# Patient Record
Sex: FEMALE | Race: WHITE | NOT HISPANIC OR LATINO | Employment: OTHER | URBAN - METROPOLITAN AREA
[De-identification: names, ages, dates, MRNs, and addresses within clinical notes are randomized per-mention and may not be internally consistent; named-entity substitution may affect disease eponyms.]

---

## 2017-06-25 ENCOUNTER — HOSPITAL ENCOUNTER (INPATIENT)
Facility: MEDICAL CENTER | Age: 22
LOS: 1 days | End: 2017-06-26
Attending: OBSTETRICS & GYNECOLOGY | Admitting: OBSTETRICS & GYNECOLOGY
Payer: MEDICAID

## 2017-06-25 LAB
BASOPHILS # BLD AUTO: 0.4 % (ref 0–1.8)
BASOPHILS # BLD: 0.04 K/UL (ref 0–0.12)
EOSINOPHIL # BLD AUTO: 0.16 K/UL (ref 0–0.51)
EOSINOPHIL NFR BLD: 1.8 % (ref 0–6.9)
ERYTHROCYTE [DISTWIDTH] IN BLOOD BY AUTOMATED COUNT: 39.8 FL (ref 35.9–50)
ERYTHROCYTE [DISTWIDTH] IN BLOOD BY AUTOMATED COUNT: 40.5 FL (ref 35.9–50)
HCT VFR BLD AUTO: 37.5 % (ref 37–47)
HCT VFR BLD AUTO: 38.3 % (ref 37–47)
HGB BLD-MCNC: 12.4 G/DL (ref 12–16)
HGB BLD-MCNC: 12.8 G/DL (ref 12–16)
HOLDING TUBE BB 8507: NORMAL
IMM GRANULOCYTES # BLD AUTO: 0.05 K/UL (ref 0–0.11)
IMM GRANULOCYTES NFR BLD AUTO: 0.6 % (ref 0–0.9)
LYMPHOCYTES # BLD AUTO: 2.7 K/UL (ref 1–4.8)
LYMPHOCYTES NFR BLD: 30.3 % (ref 22–41)
MCH RBC QN AUTO: 26.8 PG (ref 27–33)
MCH RBC QN AUTO: 27 PG (ref 27–33)
MCHC RBC AUTO-ENTMCNC: 33.1 G/DL (ref 33.6–35)
MCHC RBC AUTO-ENTMCNC: 33.4 G/DL (ref 33.6–35)
MCV RBC AUTO: 80.8 FL (ref 81.4–97.8)
MCV RBC AUTO: 81 FL (ref 81.4–97.8)
MONOCYTES # BLD AUTO: 0.73 K/UL (ref 0–0.85)
MONOCYTES NFR BLD AUTO: 8.2 % (ref 0–13.4)
NEUTROPHILS # BLD AUTO: 5.24 K/UL (ref 2–7.15)
NEUTROPHILS NFR BLD: 58.7 % (ref 44–72)
NRBC # BLD AUTO: 0 K/UL
NRBC BLD AUTO-RTO: 0 /100 WBC
PLATELET # BLD AUTO: 169 K/UL (ref 164–446)
PLATELET # BLD AUTO: 206 K/UL (ref 164–446)
PMV BLD AUTO: 10 FL (ref 9–12.9)
PMV BLD AUTO: 10.1 FL (ref 9–12.9)
RBC # BLD AUTO: 4.63 M/UL (ref 4.2–5.4)
RBC # BLD AUTO: 4.74 M/UL (ref 4.2–5.4)
WBC # BLD AUTO: 8.9 K/UL (ref 4.8–10.8)
WBC # BLD AUTO: 9.5 K/UL (ref 4.8–10.8)

## 2017-06-25 PROCEDURE — 85025 COMPLETE CBC W/AUTO DIFF WBC: CPT

## 2017-06-25 PROCEDURE — 700105 HCHG RX REV CODE 258: Performed by: PHYSICIAN ASSISTANT

## 2017-06-25 PROCEDURE — 3E033VJ INTRODUCTION OF OTHER HORMONE INTO PERIPHERAL VEIN, PERCUTANEOUS APPROACH: ICD-10-PCS | Performed by: OBSTETRICS & GYNECOLOGY

## 2017-06-25 PROCEDURE — 700102 HCHG RX REV CODE 250 W/ 637 OVERRIDE(OP): Performed by: PHYSICIAN ASSISTANT

## 2017-06-25 PROCEDURE — 700101 HCHG RX REV CODE 250

## 2017-06-25 PROCEDURE — 770002 HCHG ROOM/CARE - OB PRIVATE (112)

## 2017-06-25 PROCEDURE — 10907ZC DRAINAGE OF AMNIOTIC FLUID, THERAPEUTIC FROM PRODUCTS OF CONCEPTION, VIA NATURAL OR ARTIFICIAL OPENING: ICD-10-PCS | Performed by: OBSTETRICS & GYNECOLOGY

## 2017-06-25 PROCEDURE — 700111 HCHG RX REV CODE 636 W/ 250 OVERRIDE (IP): Performed by: PHYSICIAN ASSISTANT

## 2017-06-25 PROCEDURE — 36415 COLL VENOUS BLD VENIPUNCTURE: CPT

## 2017-06-25 PROCEDURE — 700111 HCHG RX REV CODE 636 W/ 250 OVERRIDE (IP)

## 2017-06-25 PROCEDURE — A9270 NON-COVERED ITEM OR SERVICE: HCPCS | Performed by: PHYSICIAN ASSISTANT

## 2017-06-25 PROCEDURE — 85027 COMPLETE CBC AUTOMATED: CPT

## 2017-06-25 RX ORDER — IBUPROFEN 800 MG/1
800 TABLET ORAL EVERY 8 HOURS PRN
Status: DISCONTINUED | OUTPATIENT
Start: 2017-06-25 | End: 2017-06-26 | Stop reason: HOSPADM

## 2017-06-25 RX ORDER — METHYLERGONOVINE MALEATE 0.2 MG/ML
0.2 INJECTION INTRAVENOUS
Status: DISCONTINUED | OUTPATIENT
Start: 2017-06-25 | End: 2017-06-26 | Stop reason: HOSPADM

## 2017-06-25 RX ORDER — ONDANSETRON 4 MG/1
4 TABLET, ORALLY DISINTEGRATING ORAL EVERY 6 HOURS PRN
Status: DISCONTINUED | OUTPATIENT
Start: 2017-06-25 | End: 2017-06-26 | Stop reason: HOSPADM

## 2017-06-25 RX ORDER — OXYCODONE AND ACETAMINOPHEN 10; 325 MG/1; MG/1
1 TABLET ORAL EVERY 4 HOURS PRN
Status: DISCONTINUED | OUTPATIENT
Start: 2017-06-25 | End: 2017-06-26 | Stop reason: HOSPADM

## 2017-06-25 RX ORDER — BUPIVACAINE HYDROCHLORIDE 2.5 MG/ML
INJECTION, SOLUTION EPIDURAL; INFILTRATION; INTRACAUDAL
Status: ACTIVE
Start: 2017-06-25 | End: 2017-06-25

## 2017-06-25 RX ORDER — MISOPROSTOL 200 UG/1
600 TABLET ORAL
Status: DISCONTINUED | OUTPATIENT
Start: 2017-06-25 | End: 2017-06-26 | Stop reason: HOSPADM

## 2017-06-25 RX ORDER — MISOPROSTOL 200 UG/1
800 TABLET ORAL
Status: DISCONTINUED | OUTPATIENT
Start: 2017-06-25 | End: 2017-06-25 | Stop reason: HOSPADM

## 2017-06-25 RX ORDER — OXYCODONE HYDROCHLORIDE AND ACETAMINOPHEN 5; 325 MG/1; MG/1
1 TABLET ORAL EVERY 4 HOURS PRN
Status: DISCONTINUED | OUTPATIENT
Start: 2017-06-25 | End: 2017-06-26 | Stop reason: HOSPADM

## 2017-06-25 RX ORDER — SODIUM CHLORIDE, SODIUM LACTATE, POTASSIUM CHLORIDE, CALCIUM CHLORIDE 600; 310; 30; 20 MG/100ML; MG/100ML; MG/100ML; MG/100ML
INJECTION, SOLUTION INTRAVENOUS CONTINUOUS
Status: DISPENSED | OUTPATIENT
Start: 2017-06-25 | End: 2017-06-25

## 2017-06-25 RX ORDER — METHYLERGONOVINE MALEATE 0.2 MG/ML
0.2 INJECTION INTRAVENOUS
Status: DISCONTINUED | OUTPATIENT
Start: 2017-06-25 | End: 2017-06-25 | Stop reason: HOSPADM

## 2017-06-25 RX ORDER — SODIUM CHLORIDE, SODIUM LACTATE, POTASSIUM CHLORIDE, CALCIUM CHLORIDE 600; 310; 30; 20 MG/100ML; MG/100ML; MG/100ML; MG/100ML
INJECTION, SOLUTION INTRAVENOUS PRN
Status: DISCONTINUED | OUTPATIENT
Start: 2017-06-25 | End: 2017-06-26 | Stop reason: HOSPADM

## 2017-06-25 RX ORDER — ROPIVACAINE HYDROCHLORIDE 2 MG/ML
INJECTION, SOLUTION EPIDURAL; INFILTRATION; PERINEURAL
Status: COMPLETED
Start: 2017-06-25 | End: 2017-06-25

## 2017-06-25 RX ORDER — ONDANSETRON 2 MG/ML
4 INJECTION INTRAMUSCULAR; INTRAVENOUS EVERY 6 HOURS PRN
Status: DISCONTINUED | OUTPATIENT
Start: 2017-06-25 | End: 2017-06-26 | Stop reason: HOSPADM

## 2017-06-25 RX ORDER — ACETAMINOPHEN 325 MG/1
325 TABLET ORAL EVERY 4 HOURS PRN
Status: DISCONTINUED | OUTPATIENT
Start: 2017-06-25 | End: 2017-06-26 | Stop reason: HOSPADM

## 2017-06-25 RX ADMIN — ROPIVACAINE HYDROCHLORIDE 200 MG: 2 INJECTION, SOLUTION EPIDURAL; INFILTRATION at 07:15

## 2017-06-25 RX ADMIN — Medication 41.67 MILLI-UNITS/MIN: at 12:01

## 2017-06-25 RX ADMIN — IBUPROFEN 800 MG: 800 TABLET, FILM COATED ORAL at 19:30

## 2017-06-25 RX ADMIN — SODIUM CHLORIDE, POTASSIUM CHLORIDE, SODIUM LACTATE AND CALCIUM CHLORIDE 1000 ML: 600; 310; 30; 20 INJECTION, SOLUTION INTRAVENOUS at 07:08

## 2017-06-25 RX ADMIN — Medication 332.67 MILLI-UNITS/MIN: at 10:45

## 2017-06-25 ASSESSMENT — PAIN SCALES - GENERAL
PAINLEVEL_OUTOF10: 0
PAINLEVEL_OUTOF10: 2
PAINLEVEL_OUTOF10: 3
PAINLEVEL_OUTOF10: 0

## 2017-06-25 ASSESSMENT — COPD QUESTIONNAIRES
HAVE YOU SMOKED AT LEAST 100 CIGARETTES IN YOUR ENTIRE LIFE: NO/DON'T KNOW
DURING THE PAST 4 WEEKS HOW MUCH DID YOU FEEL SHORT OF BREATH: NONE/LITTLE OF THE TIME
DO YOU EVER COUGH UP ANY MUCUS OR PHLEGM?: NO/ONLY WITH OCCASIONAL COLDS OR INFECTIONS

## 2017-06-25 ASSESSMENT — LIFESTYLE VARIABLES
DO YOU DRINK ALCOHOL: NO
EVER_SMOKED: NEVER
DO YOU DRINK ALCOHOL: NO
ALCOHOL_USE: NO

## 2017-06-25 ASSESSMENT — PATIENT HEALTH QUESTIONNAIRE - PHQ9
SUM OF ALL RESPONSES TO PHQ QUESTIONS 1-9: 0
SUM OF ALL RESPONSES TO PHQ9 QUESTIONS 1 AND 2: 0
1. LITTLE INTEREST OR PLEASURE IN DOING THINGS: NOT AT ALL
2. FEELING DOWN, DEPRESSED, IRRITABLE, OR HOPELESS: NOT AT ALL

## 2017-06-25 NOTE — PROGRESS NOTES
, SNEHA , SNEHA 39.6    +FM, denies LOF, VB.    Presenting with CO LINA's. Wes she drove from [facility] today.  She has PNC in [place] and has PNR with her/    A+/I/-/-  SVE 3/60/-2    0300 Spoke to Kendra, will recheck cervix in 1 hour.    0350 /-2. Kendra aware continue to monitor.

## 2017-06-25 NOTE — PROGRESS NOTES
Received patient to room S-342 via w/c.  Report received from MAVIS Hoffman and assumed care of patient.  Nursing assessment done.  She denies pain.  She was oriented to room, call light, infant security, emergency light, visiting hours, and unit routine.  Plan of care discussed.  Encouraged to call with any needs.

## 2017-06-25 NOTE — DELIVERY NOTE
VAGINAL DELIVERY PRODEDURE NOTE    PATIENT ID:  NAME:  Isela Mcnamara  MRN:               [id number]  YOB: 1995    On 2017, this 22 y.o., now 39w6d , GBS negative female delivered precipitously by RN via  under epidural anesthesia a viable male infant whose weights and APGAR scores are currently unknown. There was no nuchal cord and infant was bulb suctioned at delivery. Cord was doubly clamped, cut and infant placed on mothers abdomen. An intact placenta was delivered spontaneously with 3 vessel cord. Upon vaginal exam, there were no lacerations. Estimated blood loss was 300cc. Patient will be transferred to postpartum in stable condition and infant to  nursery.    Anahi Lei M.D.    Delivery attended by Dr. Nieves.

## 2017-06-25 NOTE — CARE PLAN
Problem: Potential for postpartum infection related to presence of episiotomy/vaginal tear and/or uterine contamination  Goal: Patient will be absent from signs and symptoms of infection  Outcome: PROGRESSING AS EXPECTED  Patient has no signs/symptoms of infection.  Vital signs stable. Ambulating without difficulty    Problem: Alteration in comfort related to episiotomy, vaginal repair and/or after birth pains  Goal: Patient verbalizes acceptable pain level  Outcome: PROGRESSING AS EXPECTED  Patient states pain control is adequate

## 2017-06-25 NOTE — H&P
History and Physical    Isela Mcnamara is a 22 y.o. female  -Para:     Gestational Age:  39w6d  Admitted for:   Active Labor  Admitted to  [facility].  Patient received prenatal care: In [place] - records with pt    HPI: Patient is admitted with the above mentioned Chief Complaint and States   Loss of fluid:   negative  Abdominal Pain:  negative  Uterine Contractions:  positive  Vaginal Bleeding:  negative  Fetal Movement:  normal  Patient denies fever, chills, nausea, vomiting , headache, visual disturbance, or dysuria  LMP 16.   Estimated Date of Delivery: 17  Final SNEHA: 17, by Last Menstrual Period    Patient Active Problem List    Diagnosis Date Noted   • Short interval between pregnancies complicating pregnancy, antepartum- pt delivered in March of 2013 10/18/2013   • Late prenatal care @ 24 wks  10/18/2013   • Supervision of other high-risk pregnancy 10/18/2013   • BMI 35.0-35.9,adult 10/18/2013   • Previous  delivery, antepartum 2013       Admitting DX: Pregnancy  Pregnancy Complications:  Hx PTD x 2 - 34, 36 wk  OB Risk Factors:   none  Labor State:    Active phase labor.    History:   has no past medical history of Addisons disease, Adrenal disorder, Allergy, Anemia, Anxiety, Arrhythmia, Arthritis, ASTHMA, Blood transfusion, Cancer, CATARACT, CHF (congestive heart failure), Clotting disorder, COPD, Cushings syndrome, Depression, Diabetes, Diabetic neuropathy, EMPHYSEMA, GERD (gastroesophageal reflux disease), Glaucoma, Goiter, Headache(784.0), Heart attack, Heart murmur, HIV (human immunodeficiency virus infection), Hyperlipidemia, Parathyroid disorder, Hypertension, IBD (inflammatory bowel disease), Kidney disease, Meningitis, Migraine, Muscle disorder, OSTEOPOROSIS, Pituitary disease, Seizure, Sickle cell disease, Stroke, Substance abuse, Thyroid disease, Tuberculosis, Ulcer, or Urinary tract infection, site not specified.     has no past  "surgical history on file.    OB History    Para Term  AB SAB TAB Ectopic Multiple Living   3 2 1 1      2      # Outcome Date GA Lbr Maycol/2nd Weight Sex Delivery Anes PTL Lv   3 Current            2 Term 13 40w0d  3.062 kg (6 lb 12 oz) F Vag-Spont EPI N Y      Comments: No complications    1  11 35w0d  2.211 kg (4 lb 14 oz) F Vag-Spont EPI  Y          Medications:  No current facility-administered medications on file prior to encounter.     Current Outpatient Prescriptions on File Prior to Encounter   Medication Sig Dispense Refill   • ibuprofen (MOTRIN) 800 MG TABS Take 1 Tab by mouth every 8 hours as needed (Cramping). 30 Tab 1   • docusate sodium (COLACE) 100 MG CAPS Take 1 Cap by mouth 2 times a day as needed for Constipation. 30 Cap 0   • oxycodone-acetaminophen (PERCOCET) 5-325 MG TABS Take 1 Tab by mouth every four hours as needed ((Pain Scale 4-6); If acetaminophen ineffective). 15 Each 0   • Prenatal MV-Min-Fe Fum-FA-DHA (PRENATAL 1 PO) Take  by mouth.           Allergies:  Nkda    ROS:   Neuro: negative    Cardiovascular: negative  Gastro intestinal: negative  Genitourinary: negative            Physical Exam:  /75 mmHg  Pulse 68  Ht 1.651 m (5' 5\")  Wt 99.338 kg (219 lb)  BMI 36.44 kg/m2  LMP 2013  Constitutional: healthy-appearing, Well-developed, well-nourished  No JVD: while supine  HEENT: PERRLA  Breast Exam: negative  Cardio: regular rate and rhythm  Lung: unlabored respirations, no intercostal retractions or accessory muscle use  Abdomen: abdomen is soft without significant tenderness, masses, organomegaly or guarding  Extremity: extremities, peripheral pulses and reflexes normal    Cervical Exam: 70%  Cervix Dilatation: 5  Station: negative 2  Pelvis: Normal  Fetal Assessment: Fetal heart variability: moderate  Fetal Heart Rate decelerations: none  Fetal Heart Rate accelerations: yes  Baseline FHR: 140 per minute  Uterine contractions: regular, every " 5-6 minutes  Estimated Fetal Weight: 3000 - 3500g      Labs:      Prenatal Results         1st Trimester Date Time   ABO  A  13 1401   RH  POS  13 1401   Antibody  NEG  13 1401   CBC/PLT/DIFF      HGB      Platelets      HGB A1C       1 Hr GCT      3 Hr GTT      Rubella  83.40 IU/mL 13 1401   RPR  Non Reactive  13 1401   Urine Culture      24 Urine Protein       24 Urine Creatinine       HBsAg  Negative  13 1401   Hep CAB       HIV      Gonorrhea  negative  13    Chlamydia  negative  13    TSH       Free T4        TB      Pap      SYPHILUS TREP QUAL [id number] [[id number]][      2nd Trimester Date Time   HCT      HGB      1 Hr GCT      3 Hr GTT      24-28 Weeks Date Time   1 Hr GCT       TSH       Free T4       24 Urine Protein      24 Urine Creatinine      BUN      Creatinine      GFR      AST      ALT      Uric Acid      LDH      3rd Trimester Date Time   HCT      HGB      TSH      Free T4      24 Hr Urine Protein      24 Hr Urine Creatinine      SYPHILUS TREP QUAL      35-37 Weeks Date Time   GBS PCR LB      GBS PCR      Genetic Screening Date Time   Cystic Fibrosis      AFP Quad      Sickle Cell                  View all results for this pregnancy            Assessment:  Gestational Age:  39w6d  Labor State:   Labor, Active  Risk Factors:   Hx PTD x 2  Pregnancy Complications: none    Patient Active Problem List    Diagnosis Date Noted   • Short interval between pregnancies complicating pregnancy, antepartum- pt delivered in March of 2013 10/18/2013   • Late prenatal care @ 24 wks  10/18/2013   • Supervision of other high-risk pregnancy 10/18/2013   • BMI 35.0-35.9,adult 10/18/2013   • Previous  delivery, antepartum 2013       Plan:   Admitted for: Active Labor, per [facility] records GBS neg, pain control, augment labor with pit, arom prn, anticipate .     Kendra Ya, PGiorgioA.

## 2017-06-25 NOTE — PROGRESS NOTES
"0700 - Report received from Chrystal JASSO RN at , care assumed. Hernandez Gestations today at 39.6 Weeks    EFW - [phone]    Patient is in bed awake - working through contractions with good control. IV is bolusing with LR, patient would like to have an epidural before contractions become unbearable. FOB \"Christopher\" at  sleeping on the couch - patient is not expecting more visitors today. Patient would like the FOB at  during the pushing and delivery phase.    Reports no sleep last night - Patient was traveling from [place] yesterday when she started labor. PNC obtained in [place]; records available in the chart. Denies ill feeling, reports FM, denies ROM or Bleeding - reports a scant amount of brown blood yesterday. No change to vision/edema/HA; states she has been getting up to the BR herself without assist, denies dizziness or weakness.     Use of FM/[place] discussed and in place, discussed POC, sleepy, pleasant affect/mood. Review of labor process/expectations, breathing/relaxation techniques. Discussed pain management options - patient in the the process of prep for an epidural placement. Patient given an Epidural consent to review prior to hard labor.     Patient/FOB deny questions/concerns regarding care since arrival to [facility]. State understanding of POC/expectations.  RN contact information updated on the white board with discussion regarding how to request assistance.  Patient encouraged to call RN with all questions/concerns/needs.    0720 - Epidural placed without event by Dr. Hu. Patient reports relief within 20 minutes. Plans to sleep.     1026 - Rapid dilation from 6 to delivery. Patient put in stirups and howell removed- patient states at this time the baby is coming out. RN notes the baby is not  however movement is noted. Patient is comfortable and not pushing, Dr. Lei is in the room as the baby delivers.  of male, Dr. NEDRA Lei and Dr. Nieves at . Lynn GONZALEZ RN at  to care for NB. " "No repair needed. Patient denies pain or discomfort. Epidural turned off. Food tray ordered from [facility] kitchen. Water and Apple juice at BS. FOB and patients two sisters at BS during delivery per patient request.     1200 - Sitting up eating food. States she can only move her right leg, toes/ankle on left. Denies needs. Denies pain/discomfort.     1400 - Discussed expectations of transfer to PPU. Patient/FOB deny questions/concerns/needs regarding care since arrival to [facility]. Up to the BR with assist. Positive void and use of jorge bottle. To PPU room 342 by W/C, patient holding the NB. FOB \"Christopher\" at side. Report to Nohemy LARA RN  "

## 2017-06-25 NOTE — CARE PLAN
Problem: Safety  Goal: Free from accidental injury  Outcome: PROGRESSING AS EXPECTED  Patient/Family instructed to call RN with any spills, cords in the way on the floor or any other safety hazards. Patient/Family also instructed to call RN with change to LOC, feelings of dizziness, blurry vision or any change to comfort or concern for safety. Medication administered as ordered, verified with patient/scanned in to MAR and armband on patient.     Problem: Knowledge Deficit  Goal: Patient/Support person demonstrates understanding regarding the progression of labor, available options and participates in decision-making process  Outcome: PROGRESSING AS EXPECTED  Discussed POC. Review of labor process/expectations, breathing/relaxation techniques. Discussed pain management options. Patient/Family deny questions/concerns at this point, state understanding of POC/expectiations. Patient encouraged to call RN with all questions/concerns/needs.

## 2017-06-25 NOTE — IP AVS SNAPSHOT
Home Care Instructions                                                                                                                Isela Mcnamara   MRN: [id number]    Department:  POST PARTUM [facility]   2017           Follow-up Information     1. Follow up with Pregnancy ROBERTO Rosario In 5 weeks.    Specialty:  OB/Gyn    Contact information    [address] [place] [address]  Higinio JOLLEY [address]  [phone]         I assume responsibility for securing a follow-up  Screening blood test on my baby within the specified date range.    -                  Discharge Instructions       POSTPARTUM DISCHARGE INSTRUCTIONS FOR MOM    YOB: 1995   Age: 22 y.o.               Admit Date: 2017     Discharge Date: 2017  Attending Doctor:  Brannon Templeton M.D.                  Allergies:  Nkda    Discharged to home by car. Discharged via wheelchair, hospital escort: Yes.  Special equipment needed: Not Applicable  Belongings with: Personal  Be sure to schedule a follow-up appointment with your primary care doctor or any specialists as instructed.     Discharge Plan:   Diet Plan: Discussed  Activity Level: Discussed  Confirmed Follow up Appointment: Patient to Call and Schedule Appointment  Confirmed Symptoms Management: Discussed  Medication Reconciliation Updated: Yes  Influenza Vaccine Indication: Indicated: Not available from distributor/    REASONS TO CALL YOUR OBSTETRICIAN:  1.   Persistent fever or shaking chills (Temperature higher than 100.4)  2.   Heavy bleeding (soaking more than 1 pad per hour); Passing clots  3.   Foul odor from vagina  4.   Mastitis (Breast infection; breast pain, chills, fever, redness)  5.   Urinary pain, burning or frequency  6.   Severe depression longer than 24 hours    HAND WASHING  · Prior to handling the baby.  · Before breastfeeding or bottle feeding baby.  · After using the bathroom or changing the baby's diaper.     VAGINAL CARE  · Nothing inside vagina for 6  "weeks: no sexual intercourse, tampons or douching.  · Bleeding may continue for 2-4 weeks.  Amount may vary.    · Call your physician for heavy bleeding which means soaking more than 1 pad per hour    BIRTH CONTROL  · It is possible to become pregnant at any time after delivery and while breastfeeding.  · Plan to discuss a method of birth control with your physician at your follow up visit. visit.    DIET AND ELIMINATION  · Eating more fiber (bran cereal, fruits, and vegetables) and drinking plenty of fluids will help to avoid constipation.  · Urinary frequency after childbirth is normal.    POSTPARTUM BLUES  During the first few days after birth, you may experience a sense of the \"blues\" which may include impatience, irritability or even crying.  These feeling come and go quickly.  However, as many as 1 in 10 women experience emotional symptoms known as postpartum depression.    Postpartum depression:  May start as early as the second or third day after delivery or take several weeks or months to develop.  Symptoms of \"blues\" are present, but are more intense:  Crying spells; loss of appetite; feelings of hopelessness or loss of control; fear of touching the baby; over concern or no concern at all about the baby; little or no concern about your own appearance/caring for yourself; and/or inability to sleep or excessive sleeping.  Contact your physician if you are experiencing any of these symptoms.    Crisis Hotline:  · [place] Crisis Hotline:  0-260-DFHTJET  Or 1-759.437.7644  · [place] Crisis Hotline:  1-830.739.8739  Or 885-914-4265    PREVENTING SHAKEN BABY:  If you are angry or stressed, PUT THE BABY IN THE CRIB, step away, take some deep breaths, and wait until you are calm to care for the baby.  DO NOT SHAKE THE BABY.  You are not alone, call a supporter for help.    · Crisis Call Center 24/7 crisis line 455-949-7505 or 1-390.895.2138  · You can also text them, text \"ANSWER\" to 188806    QUIT SMOKING/TOBACCO " USE:  I understand the use of any tobacco products increases my chance of suffering from future heart disease and could cause other illnesses which may shorten my life. Quitting the use of tobacco products is the single most important thing I can do to improve my health. For further information on smoking / tobacco cessation call a Toll Free Quit Line at 1-100.808.4613 (*[employer]) or 1-450.162.2728 (American Lung Association) or you can access the web based program at www.lungusa.org.    · [place] Tobacco Users Help Line:  (385) 262-6507       Toll Free: 1-961.983.2781  · Quit Tobacco Program [facility] (239)955-4339    DEPRESSION / SUICIDE RISK:  As you are discharged from this [facility], it is important to learn how to keep safe from harming yourself.    Recognize the warning signs:  · Abrupt changes in personality, positive or negative- including increase in energy   · Giving away possessions  · Change in eating patterns- significant weight changes-  positive or negative  · Change in sleeping patterns- unable to sleep or sleeping all the time   · Unwillingness or inability to communicate  · Depression  · Unusual sadness, discouragement and loneliness  · Talk of wanting to die  · Neglect of personal appearance   · Rebelliousness- reckless behavior  · Withdrawal from people/activities they love  · Confusion- inability to concentrate     If you or a loved one observes any of these behaviors or has concerns about self-harm, here's what you can do:  · Talk about it- your feelings and reasons for harming yourself  · Remove any means that you might use to hurt yourself (examples: pills, rope, extension cords, firearm)  · Get professional help from the community (Mental Health, Substance Abuse, psychological counseling)  · Do not be alone:Call your Safe Contact- someone whom you trust who will be there for you.  · Call your local CRISIS HOTLINE 226-7031 or  329.275.9034  · Call your local Children's Mobile Crisis Response Team [place] (372) 535-6260 or www.[employer].[facility]  · Call the toll free National Suicide Prevention Hotlines   · National Suicide Prevention Lifeline 776-074-PWXO (9085)  · National Hope Line Network 800-SUICIDE (117-2646)    DISCHARGE SURVEY:  Thank you for choosing [facility].  We hope we provided you with very good care.  You may be receiving a survey in the mail.  Please fill it out.  Your opinion is valuable to us.    ADDITIONAL EDUCATIONAL MATERIALS GIVEN TO PATIENT:        My signature on this form indicates that:  1.  I have reviewed and understand the above information  2.  My questions regarding this information have been answered to my satisfaction.  3.  I have formulated a plan with my discharge nurse to obtain my prescribed medication for home.         Discharge Medication Instructions:    Below are the medications your physician expects you to take upon discharge:    Review all your home medications and newly ordered medications with your doctor and/or pharmacist. Follow medication instructions as directed by your doctor and/or pharmacist.    Please keep your medication list with you and share with your physician.               Medication List      START taking these medications        Instructions    Morning Afternoon Evening Bedtime    ibuprofen 800 MG Tabs   Last time this was given:  800 mg on 6/26/2017  3:40 AM   Commonly known as:  MOTRIN        Take 1 Tab by mouth every 8 hours as needed (For cramping after delivery; do not give if patient is receiving ketorolac (Toradol)).   Dose:  800 mg                        oxycodone-acetaminophen 5-325 MG Tabs   Commonly known as:  PERCOCET        Take 1 Tab by mouth every four hours as needed (for Moderate Pain (Pain Scale 4-6) after delivery).   Dose:  1 Tab                          CONTINUE taking these medications        Instructions    Morning Afternoon Evening Bedtime     PRENATAL 1 PO        Take  by mouth.                             Where to Get Your Medications      Information about where to get these medications is not yet available     ! Ask your nurse or doctor about these medications    - ibuprofen 800 MG Tabs  - oxycodone-acetaminophen 5-325 MG Tabs            Crisis Hotline:     [place] Crisis Hotline:  1-255-HZFJSHC or 1-990.442.5737    [place] Crisis Hotline:    1-939.756.5811 or 755-292-7852        Disclaimer           _____________________________________                     __________       ________       Patient/Mother Signature or Legal                          Date                   Time

## 2017-06-26 VITALS
DIASTOLIC BLOOD PRESSURE: 73 MMHG | HEART RATE: 69 BPM | OXYGEN SATURATION: 97 % | WEIGHT: 219 LBS | HEIGHT: 65 IN | SYSTOLIC BLOOD PRESSURE: 129 MMHG | TEMPERATURE: 98 F | RESPIRATION RATE: 18 BRPM | BODY MASS INDEX: 36.49 KG/M2

## 2017-06-26 PROCEDURE — A9270 NON-COVERED ITEM OR SERVICE: HCPCS | Performed by: PHYSICIAN ASSISTANT

## 2017-06-26 PROCEDURE — 700102 HCHG RX REV CODE 250 W/ 637 OVERRIDE(OP): Performed by: PHYSICIAN ASSISTANT

## 2017-06-26 RX ORDER — IBUPROFEN 800 MG/1
800 TABLET ORAL EVERY 8 HOURS PRN
Qty: 30 TAB | Refills: 0 | Status: SHIPPED | OUTPATIENT
Start: 2017-06-26

## 2017-06-26 RX ORDER — OXYCODONE HYDROCHLORIDE AND ACETAMINOPHEN 5; 325 MG/1; MG/1
1 TABLET ORAL EVERY 4 HOURS PRN
Qty: 5 TAB | Refills: 0 | Status: SHIPPED | OUTPATIENT
Start: 2017-06-26

## 2017-06-26 RX ADMIN — IBUPROFEN 800 MG: 800 TABLET, FILM COATED ORAL at 03:40

## 2017-06-26 ASSESSMENT — LIFESTYLE VARIABLES: EVER_SMOKED: YES

## 2017-06-26 ASSESSMENT — PAIN SCALES - GENERAL
PAINLEVEL_OUTOF10: 0
PAINLEVEL_OUTOF10: 2

## 2017-06-26 NOTE — DISCHARGE SUMMARY
Discharge Summary:      Isela Mcnamara      Admit Date:   2017  Discharge Date:  2017     Admitting diagnosis:  Pregnancy  Indication for care in labor or delivery  Discharge Diagnosis: Status post vaginal, spontaneous.  Pregnancy Complications: none  Tubal Ligation:  no        History:  History reviewed. No pertinent past medical history.  OB History    Para Term  AB SAB TAB Ectopic Multiple Living   3 2 1 1      2      # Outcome Date GA Lbr Maycol/2nd Weight Sex Delivery Anes PTL Lv   3 Current            2 Term 13 40w0d  3.062 kg (6 lb 12 oz) F Vag-Spont EPI N Y      Comments: No complications    1  11 35w0d  2.211 kg (4 lb 14 oz) F Vag-Spont EPI  Y           Nkda  Patient Active Problem List    Diagnosis Date Noted   • Short interval between pregnancies complicating pregnancy, antepartum- pt delivered in March of 2013 10/18/2013   • Late prenatal care @ 24 wks  10/18/2013   • Supervision of other high-risk pregnancy 10/18/2013   • BMI 35.0-35.9,adult 10/18/2013   • Previous  delivery, antepartum 2013        Hospital Course:   22 y.o. , now para 3, was admitted with the above mentioned diagnosis, underwent Active Labor, vaginal, spontaneous. Patient postpartum course was unremarkable, with progressive advancement in diet , ambulation and toleration of oral analgesia. Patient without complaints today and desires discharge.      Filed Vitals:    17 1155 17 1210 17 1545 17   BP: 112/59 105/58 120/62 116/61   Pulse: 63 70 58 62   Temp:   36.5 °C (97.7 °F) 36.4 °C (97.5 °F)   TempSrc:       Resp:   16 17   Height:       Weight:       SpO2:   94% 95%       Current Facility-Administered Medications   Medication Dose   • ondansetron (ZOFRAN ODT) dispertab 4 mg  4 mg    Or   • ondansetron (ZOFRAN) syringe/vial injection 4 mg  4 mg   • oxytocin (PITOCIN) infusion (for postpartum)  2,000 mL/hr    Followed by   • oxytocin  (PITOCIN) infusion (for postpartum)   mL/hr   • ibuprofen (MOTRIN) tablet 800 mg  800 mg   • acetaminophen (TYLENOL) tablet 325 mg  325 mg   • oxycodone-acetaminophen (PERCOCET) 5-325 MG per tablet 1 Tab  1 Tab   • oxycodone-acetaminophen (PERCOCET-10)  MG per tablet 1 Tab  1 Tab   • LR infusion     • methylergonovine (METHERGINE) injection 0.2 mg  0.2 mg   • misoprostol (CYTOTEC) tablet 600 mcg  600 mcg   • PRN oxytocin (PITOCIN) (20 Units/1000 mL) PRN for excessive uterine bleeding - See Admin Instr  125-999 mL/hr       Exam:  Breast Exam: negative  Abdomen: Abdomen soft, non-tender. BS normal. No masses,  No organomegaly  Fundus Non Tender: yes  Incision: none  Perineum: perineum intact  Extremity: extremities, peripheral pulses and reflexes normal     Labs:  Recent Labs      06/25/17   0630  06/25/17   1955   WBC  8.9  9.5   RBC  4.63  4.74   HEMOGLOBIN  12.4  12.8   HEMATOCRIT  37.5  38.3   MCV  81.0*  80.8*   MCH  26.8*  27.0   MCHC  33.1*  33.4*   RDW  40.5  39.8   PLATELETCT  206  169   MPV  10.0  10.1        Activity:   Discharge to home  Pelvic Rest x 6 weeks    Assessment:  normal postpartum course  Discharge Assessment: No areas of skin breakdown/redness; surgical incision intact/healing     Follow up: .[facility] or [facility] in 5 weeks for vaginal ; 1 week for incision check.      Discharge Meds:   Current Outpatient Prescriptions   Medication Sig Dispense Refill   • oxycodone-acetaminophen (PERCOCET) 5-325 MG Tab Take 1 Tab by mouth every four hours as needed (for Moderate Pain (Pain Scale 4-6) after delivery). 5 Tab 0   • ibuprofen (MOTRIN) 800 MG Tab Take 1 Tab by mouth every 8 hours as needed (For cramping after delivery; do not give if patient is receiving ketorolac (Toradol)). 30 Tab 0       CARLO GeeP.

## 2017-06-26 NOTE — PROGRESS NOTES
Nursing assessment done.  Plan of care discussed. Pain management plan discussed. Patient will notify RN when she would like pain medication.  Patient encouraged to call for any needs.

## 2017-06-26 NOTE — DISCHARGE INSTRUCTIONS
POSTPARTUM DISCHARGE INSTRUCTIONS FOR MOM    YOB: 1995   Age: 22 y.o.               Admit Date: 6/25/2017     Discharge Date: 6/26/2017  Attending Doctor:  Brannon Templeton M.D.                  Allergies:  Nkda    Discharged to home by car. Discharged via wheelchair, hospital escort: Yes.  Special equipment needed: Not Applicable  Belongings with: Personal  Be sure to schedule a follow-up appointment with your primary care doctor or any specialists as instructed.     Discharge Plan:   Diet Plan: Discussed  Activity Level: Discussed  Confirmed Follow up Appointment: Patient to Call and Schedule Appointment  Confirmed Symptoms Management: Discussed  Medication Reconciliation Updated: Yes  Influenza Vaccine Indication: Indicated: Not available from distributor/    REASONS TO CALL YOUR OBSTETRICIAN:  1.   Persistent fever or shaking chills (Temperature higher than 100.4)  2.   Heavy bleeding (soaking more than 1 pad per hour); Passing clots  3.   Foul odor from vagina  4.   Mastitis (Breast infection; breast pain, chills, fever, redness)  5.   Urinary pain, burning or frequency  6.   Severe depression longer than 24 hours    HAND WASHING  · Prior to handling the baby.  · Before breastfeeding or bottle feeding baby.  · After using the bathroom or changing the baby's diaper.     VAGINAL CARE  · Nothing inside vagina for 6 weeks: no sexual intercourse, tampons or douching.  · Bleeding may continue for 2-4 weeks.  Amount may vary.    · Call your physician for heavy bleeding which means soaking more than 1 pad per hour    BIRTH CONTROL  · It is possible to become pregnant at any time after delivery and while breastfeeding.  · Plan to discuss a method of birth control with your physician at your follow up visit. visit.    DIET AND ELIMINATION  · Eating more fiber (bran cereal, fruits, and vegetables) and drinking plenty of fluids will help to avoid constipation.  · Urinary frequency after childbirth is  "normal.    POSTPARTUM BLUES  During the first few days after birth, you may experience a sense of the \"blues\" which may include impatience, irritability or even crying.  These feeling come and go quickly.  However, as many as 1 in 10 women experience emotional symptoms known as postpartum depression.    Postpartum depression:  May start as early as the second or third day after delivery or take several weeks or months to develop.  Symptoms of \"blues\" are present, but are more intense:  Crying spells; loss of appetite; feelings of hopelessness or loss of control; fear of touching the baby; over concern or no concern at all about the baby; little or no concern about your own appearance/caring for yourself; and/or inability to sleep or excessive sleeping.  Contact your physician if you are experiencing any of these symptoms.    Crisis Hotline:  · [place] Crisis Hotline:  5-893-ONHKLRN  Or 1-118.925.5268  · [place] Crisis Hotline:  1-618.571.8162  Or 411-521-5045    PREVENTING SHAKEN BABY:  If you are angry or stressed, PUT THE BABY IN THE CRIB, step away, take some deep breaths, and wait until you are calm to care for the baby.  DO NOT SHAKE THE BABY.  You are not alone, call a supporter for help.    · Crisis [facility] Center 24/7 crisis line 860-988-0441 or 1-718.660.6523  · You can also text them, text \"ANSWER\" to 544647    QUIT SMOKING/TOBACCO USE:  I understand the use of any tobacco products increases my chance of suffering from future heart disease and could cause other illnesses which may shorten my life. Quitting the use of tobacco products is the single most important thing I can do to improve my health. For further information on smoking / tobacco cessation call a Toll Free Quit Line at 1-152.254.6284 (*[employer] [place]) or 1-217.415.1074 (American [employer]) or you can access the web based program at www.lungusa.org.    · [place] Tobacco Users Help Line:  (643) 930-3474       Toll Free: " 1-902-060-3536  · Quit Tobacco Program [facility] (366)132-4342    DEPRESSION / SUICIDE RISK:  As you are discharged from this [facility], it is important to learn how to keep safe from harming yourself.    Recognize the warning signs:  · Abrupt changes in personality, positive or negative- including increase in energy   · Giving away possessions  · Change in eating patterns- significant weight changes-  positive or negative  · Change in sleeping patterns- unable to sleep or sleeping all the time   · Unwillingness or inability to communicate  · Depression  · Unusual sadness, discouragement and loneliness  · Talk of wanting to die  · Neglect of personal appearance   · Rebelliousness- reckless behavior  · Withdrawal from people/activities they love  · Confusion- inability to concentrate     If you or a loved one observes any of these behaviors or has concerns about self-harm, here's what you can do:  · Talk about it- your feelings and reasons for harming yourself  · Remove any means that you might use to hurt yourself (examples: pills, rope, extension cords, firearm)  · Get professional help from the community (Mental Health, Substance Abuse, psychological counseling)  · Do not be alone:Call your Safe Contact- someone whom you trust who will be there for you.  · Call your local CRISIS HOTLINE 828-0355 or 964-563-0425  · Call your local Children's Mobile Crisis Response Team [place] (691) 380-7222 or www.Tradiio  · Call the toll free National Suicide Prevention Hotlines   · National Suicide Prevention Lifeline 042-173-QWJU (9164)  · National Hope Line Network 800-SUICIDE (807-4710)    DISCHARGE SURVEY:  Thank you for choosing [facility].  We hope we provided you with very good care.  You may be receiving a survey in the mail.  Please fill it out.  Your opinion is valuable to us.    ADDITIONAL EDUCATIONAL MATERIALS GIVEN TO PATIENT:        My signature on this form  indicates that:  1.  I have reviewed and understand the above information  2.  My questions regarding this information have been answered to my satisfaction.  3.  I have formulated a plan with my discharge nurse to obtain my prescribed medication for home.

## 2017-06-26 NOTE — CARE PLAN
Problem: Communication  Goal: The ability to communicate needs accurately and effectively will improve  Outcome: PROGRESSING AS EXPECTED  Pt. Ambulating, taking adequate PO fluids, and voiding. Vitals stable, pt. Would like to strictly bottle feed infant. Education done, pt. Verbalized understanding. All questions and concerns answered.     Problem: Pain Management  Goal: Pain level will decrease to patient’s comfort goal  Outcome: PROGRESSING AS EXPECTED  Pt. Would like to keep PRN motrin as scheduled.

## 2017-06-27 NOTE — PROGRESS NOTES
Patient education and discharge instructions reviewed with patient and patient states understanding.  Patient states all questions have been answered and denies any problems.  Patient discharged home in stable condition with all belongings.